# Patient Record
Sex: FEMALE | NOT HISPANIC OR LATINO
[De-identification: names, ages, dates, MRNs, and addresses within clinical notes are randomized per-mention and may not be internally consistent; named-entity substitution may affect disease eponyms.]

---

## 2023-07-06 ENCOUNTER — APPOINTMENT (OUTPATIENT)
Dept: OTOLARYNGOLOGY | Facility: CLINIC | Age: 28
End: 2023-07-06
Payer: COMMERCIAL

## 2023-07-06 DIAGNOSIS — G44.209 TENSION-TYPE HEADACHE, UNSPECIFIED, NOT INTRACTABLE: ICD-10-CM

## 2023-07-06 DIAGNOSIS — H93.293 OTHER ABNORMAL AUDITORY PERCEPTIONS, BILATERAL: ICD-10-CM

## 2023-07-06 DIAGNOSIS — H92.03 OTALGIA, BILATERAL: ICD-10-CM

## 2023-07-06 DIAGNOSIS — R42 DIZZINESS AND GIDDINESS: ICD-10-CM

## 2023-07-06 PROBLEM — Z00.00 ENCOUNTER FOR PREVENTIVE HEALTH EXAMINATION: Status: ACTIVE | Noted: 2023-07-06

## 2023-07-06 PROCEDURE — 92557 COMPREHENSIVE HEARING TEST: CPT

## 2023-07-06 PROCEDURE — 92567 TYMPANOMETRY: CPT

## 2023-07-06 PROCEDURE — 99204 OFFICE O/P NEW MOD 45 MIN: CPT

## 2023-07-06 RX ORDER — DIAZEPAM 2 MG/1
2 TABLET ORAL
Qty: 10 | Refills: 0 | Status: ACTIVE | COMMUNITY
Start: 2023-07-06 | End: 1900-01-01

## 2023-07-06 NOTE — HISTORY OF PRESENT ILLNESS
[de-identified] : FRANCISCO VIDALES is a 28 year woman with a history of 2 weeks of severe headache that went around her head. Her neck is also bothering her. She developed positional dizziness 7/1 with mild nausea. Non contrast CT was normal in ER.

## 2023-07-06 NOTE — ASSESSMENT
[FreeTextEntry1] : FRANCISCO VIDALES appears to have muscle tension headache.\par I suggested a soft diet, chewing evenly and avoiding nuts and gum.\par I also suggested heat and gentle massage to trapezius muscle.\par I also suggested an OTC ([employer]) grinding guard.\par I will prescribe diazepam 2mg for muscle spasm and dizziness.\par I will speak to her in 24 hours.\par \par I will refer her for vestibular therapy.

## 2023-07-07 PROBLEM — H92.03 OTALGIA OF BOTH EARS: Status: ACTIVE | Noted: 2023-07-07

## 2023-07-07 PROBLEM — H93.293 ABNORMAL AUDITORY PERCEPTION OF BOTH EARS: Status: ACTIVE | Noted: 2023-07-07

## 2023-07-07 PROBLEM — R42 DIZZINESS AND GIDDINESS: Status: ACTIVE | Noted: 2023-07-07
